# Patient Record
Sex: FEMALE | HISPANIC OR LATINO | URBAN - METROPOLITAN AREA
[De-identification: names, ages, dates, MRNs, and addresses within clinical notes are randomized per-mention and may not be internally consistent; named-entity substitution may affect disease eponyms.]

---

## 2019-12-13 ENCOUNTER — EMERGENCY (EMERGENCY)
Facility: HOSPITAL | Age: 54
LOS: 1 days | Discharge: HOME | End: 2019-12-13
Attending: EMERGENCY MEDICINE | Admitting: EMERGENCY MEDICINE
Payer: COMMERCIAL

## 2019-12-13 VITALS
TEMPERATURE: 98 F | SYSTOLIC BLOOD PRESSURE: 155 MMHG | HEIGHT: 62 IN | WEIGHT: 147.93 LBS | HEART RATE: 86 BPM | RESPIRATION RATE: 18 BRPM | OXYGEN SATURATION: 99 % | DIASTOLIC BLOOD PRESSURE: 92 MMHG

## 2019-12-13 VITALS
TEMPERATURE: 99 F | DIASTOLIC BLOOD PRESSURE: 67 MMHG | SYSTOLIC BLOOD PRESSURE: 108 MMHG | RESPIRATION RATE: 18 BRPM | HEART RATE: 86 BPM | OXYGEN SATURATION: 97 %

## 2019-12-13 LAB
ALBUMIN SERPL ELPH-MCNC: 4.8 G/DL — SIGNIFICANT CHANGE UP (ref 3.3–5)
ALP SERPL-CCNC: 65 U/L — SIGNIFICANT CHANGE UP (ref 40–120)
ALT FLD-CCNC: 26 U/L — SIGNIFICANT CHANGE UP (ref 10–45)
ANION GAP SERPL CALC-SCNC: 13 MMOL/L — SIGNIFICANT CHANGE UP (ref 5–17)
APPEARANCE UR: CLEAR — SIGNIFICANT CHANGE UP
AST SERPL-CCNC: 26 U/L — SIGNIFICANT CHANGE UP (ref 10–40)
BASOPHILS # BLD AUTO: 0.03 K/UL — SIGNIFICANT CHANGE UP (ref 0–0.2)
BASOPHILS NFR BLD AUTO: 0.2 % — SIGNIFICANT CHANGE UP (ref 0–2)
BILIRUB SERPL-MCNC: 0.5 MG/DL — SIGNIFICANT CHANGE UP (ref 0.2–1.2)
BILIRUB UR-MCNC: NEGATIVE — SIGNIFICANT CHANGE UP
BUN SERPL-MCNC: 8 MG/DL — SIGNIFICANT CHANGE UP (ref 7–23)
CALCIUM SERPL-MCNC: 10 MG/DL — SIGNIFICANT CHANGE UP (ref 8.4–10.5)
CHLORIDE SERPL-SCNC: 101 MMOL/L — SIGNIFICANT CHANGE UP (ref 96–108)
CO2 SERPL-SCNC: 25 MMOL/L — SIGNIFICANT CHANGE UP (ref 22–31)
COLOR SPEC: YELLOW — SIGNIFICANT CHANGE UP
CREAT SERPL-MCNC: 0.7 MG/DL — SIGNIFICANT CHANGE UP (ref 0.5–1.3)
DIFF PNL FLD: NEGATIVE — SIGNIFICANT CHANGE UP
EOSINOPHIL # BLD AUTO: 0.04 K/UL — SIGNIFICANT CHANGE UP (ref 0–0.5)
EOSINOPHIL NFR BLD AUTO: 0.3 % — SIGNIFICANT CHANGE UP (ref 0–6)
GLUCOSE SERPL-MCNC: 114 MG/DL — HIGH (ref 70–99)
GLUCOSE UR QL: NEGATIVE — SIGNIFICANT CHANGE UP
HCT VFR BLD CALC: 39.2 % — SIGNIFICANT CHANGE UP (ref 34.5–45)
HGB BLD-MCNC: 12.8 G/DL — SIGNIFICANT CHANGE UP (ref 11.5–15.5)
IMM GRANULOCYTES NFR BLD AUTO: 0.3 % — SIGNIFICANT CHANGE UP (ref 0–1.5)
KETONES UR-MCNC: ABNORMAL MG/DL
LEUKOCYTE ESTERASE UR-ACNC: NEGATIVE — SIGNIFICANT CHANGE UP
LYMPHOCYTES # BLD AUTO: 15.7 % — SIGNIFICANT CHANGE UP (ref 13–44)
LYMPHOCYTES # BLD AUTO: 2.12 K/UL — SIGNIFICANT CHANGE UP (ref 1–3.3)
MCHC RBC-ENTMCNC: 28.2 PG — SIGNIFICANT CHANGE UP (ref 27–34)
MCHC RBC-ENTMCNC: 32.7 GM/DL — SIGNIFICANT CHANGE UP (ref 32–36)
MCV RBC AUTO: 86.3 FL — SIGNIFICANT CHANGE UP (ref 80–100)
MONOCYTES # BLD AUTO: 1.38 K/UL — HIGH (ref 0–0.9)
MONOCYTES NFR BLD AUTO: 10.2 % — SIGNIFICANT CHANGE UP (ref 2–14)
NEUTROPHILS # BLD AUTO: 9.88 K/UL — HIGH (ref 1.8–7.4)
NEUTROPHILS NFR BLD AUTO: 73.3 % — SIGNIFICANT CHANGE UP (ref 43–77)
NITRITE UR-MCNC: NEGATIVE — SIGNIFICANT CHANGE UP
NRBC # BLD: 0 /100 WBCS — SIGNIFICANT CHANGE UP (ref 0–0)
PH UR: 6 — SIGNIFICANT CHANGE UP (ref 5–8)
PLATELET # BLD AUTO: 282 K/UL — SIGNIFICANT CHANGE UP (ref 150–400)
POTASSIUM SERPL-MCNC: 3.9 MMOL/L — SIGNIFICANT CHANGE UP (ref 3.5–5.3)
POTASSIUM SERPL-SCNC: 3.9 MMOL/L — SIGNIFICANT CHANGE UP (ref 3.5–5.3)
PROT SERPL-MCNC: 8.3 G/DL — SIGNIFICANT CHANGE UP (ref 6–8.3)
PROT UR-MCNC: NEGATIVE MG/DL — SIGNIFICANT CHANGE UP
RBC # BLD: 4.54 M/UL — SIGNIFICANT CHANGE UP (ref 3.8–5.2)
RBC # FLD: 12.8 % — SIGNIFICANT CHANGE UP (ref 10.3–14.5)
SODIUM SERPL-SCNC: 139 MMOL/L — SIGNIFICANT CHANGE UP (ref 135–145)
SP GR SPEC: 1.02 — SIGNIFICANT CHANGE UP (ref 1–1.03)
UROBILINOGEN FLD QL: 0.2 E.U./DL — SIGNIFICANT CHANGE UP
WBC # BLD: 13.49 K/UL — HIGH (ref 3.8–10.5)
WBC # FLD AUTO: 13.49 K/UL — HIGH (ref 3.8–10.5)

## 2019-12-13 PROCEDURE — 76856 US EXAM PELVIC COMPLETE: CPT | Mod: 26

## 2019-12-13 PROCEDURE — 80053 COMPREHEN METABOLIC PANEL: CPT

## 2019-12-13 PROCEDURE — 36415 COLL VENOUS BLD VENIPUNCTURE: CPT

## 2019-12-13 PROCEDURE — 85025 COMPLETE CBC W/AUTO DIFF WBC: CPT

## 2019-12-13 PROCEDURE — 76830 TRANSVAGINAL US NON-OB: CPT

## 2019-12-13 PROCEDURE — 76830 TRANSVAGINAL US NON-OB: CPT | Mod: 26

## 2019-12-13 PROCEDURE — 96374 THER/PROPH/DIAG INJ IV PUSH: CPT

## 2019-12-13 PROCEDURE — 99284 EMERGENCY DEPT VISIT MOD MDM: CPT | Mod: 25

## 2019-12-13 PROCEDURE — 99285 EMERGENCY DEPT VISIT HI MDM: CPT

## 2019-12-13 PROCEDURE — ZZZZZ: CPT

## 2019-12-13 PROCEDURE — 76856 US EXAM PELVIC COMPLETE: CPT

## 2019-12-13 PROCEDURE — 81003 URINALYSIS AUTO W/O SCOPE: CPT

## 2019-12-13 PROCEDURE — 87086 URINE CULTURE/COLONY COUNT: CPT

## 2019-12-13 RX ORDER — IBUPROFEN 200 MG
1 TABLET ORAL
Qty: 30 | Refills: 0
Start: 2019-12-13

## 2019-12-13 RX ORDER — MORPHINE SULFATE 50 MG/1
4 CAPSULE, EXTENDED RELEASE ORAL ONCE
Refills: 0 | Status: DISCONTINUED | OUTPATIENT
Start: 2019-12-13 | End: 2019-12-13

## 2019-12-13 RX ADMIN — MORPHINE SULFATE 4 MILLIGRAM(S): 50 CAPSULE, EXTENDED RELEASE ORAL at 11:49

## 2019-12-13 NOTE — CONSULT NOTE ADULT - ATTENDING COMMENTS
Patient discussed with Dr. Henriquez. Plan to follow up with me in the office for additional work up.

## 2019-12-13 NOTE — ED PROVIDER NOTE - NSFOLLOWUPINSTRUCTIONS_ED_ALL_ED_FT
Take motrin for pain as prescribed.  Call to make appointment with GYN oncology specialist within one week.  Return to ED for fever, vomiting, worsening pain, dizziness, fainting.    Tumores ováricos  Ovarian Tumors     Los tumores ováricos son crecimientos sólidos en los órganos que producen óvulos en las mujeres (ovarios). Los ovarios están ubicados a cada lado del útero. Los tumores son diferentes de los quistes ováricos, que están llenos de líquido.  Los tumores pueden ser cancerosos o no cancerosos. Todos los tumores sólidos deben ser evaluados por un médico para garantizar de que no garcia cancerosos.  ¿Cuáles son las causas?  Se desconoce la causa de esta afección.  ¿Qué incrementa el riesgo?  Esta afección es más probable en las mujeres que tienen estas características:  Hair atravesado la menopausia.Son mayores de 50 años.Son descendientes de [facility] o europeos del [facility].Tienen antecedentes personales o familiares de cáncer de endometrio, ovario, colon o mama.Son obesas.Son portadoras de alguno de los genes asociados con el cáncer de mama (BRCA1 o BRCA2).Joseph medicamentos para la fertilidad.Joseph estrógeno después de la menopausia.Nicolas primer embarazo fue cuando konrad mayores de 30 años.Nunca hair llevado a término un embarazo.Nunca hair tenido un embarazo.¿Cuáles son los signos o los síntomas?  Los tumores no cancerosos pueden no causar ningún síntoma. Los tumores cancerosos pueden causar síntomas leves, similares a los de otros problemas de taiwo. Los síntomas de los tumores ováricos cancerosos incluyen los siguientes:  Pérdida de peso sin causa aparente.Un aumento en el tamaño del abdomen.Dolor en el abdomen.Dolor o presión en la espalda o en la maral entre los huesos de la cadera (pelvis).Cansancio.Sangrado vaginal anómalo.Pérdida del apetito.Micción frecuente o presión en la vejiga.Empacho, aumento de gases y meteorismo.Dolor brittany las relaciones sexuales.¿Cómo se diagnostica?  Esta afección se puede diagnosticar en función de los resultados de lo siguiente:  Un examen pélvico.Análisis de pilo.Estudios de diagnóstico por imágenes, amada jenise ecografía, jenise radiografía, jenise exploración por tomografía computarizada (TC) o jenise resonancia magnética (RM).Jenise biopsia. Fauzia es un procedimiento en el que se extirpa jenise pequeña muestra de tejido del tumor para examinarla con un microscopio.Jenise laparoscopia. Fauzia es un procedimiento en el que se inserta un tubo reed e iluminado por jenise pequeña incisión en la parte inferior del abdomen, que se utiliza para balbir imágenes de los órganos de la pelvis.¿Cómo se trata?  Si tiene un tumor no canceroso, el tratamiento más común es la cirugía para extirpar el tumor.  Si tiene un tumor canceroso, el tratamiento puede incluir justino o más de los siguientes procedimientos o tratamientos:  Cirugía para extirpar el tumor y el ovario. En algunos casos, la cirugía puede implicar la extirpación de ambos ovarios, las trompas de Falopio, el útero, el daniel uterino y los ganglios linfáticos circundantes para verificar si el cáncer se ha diseminado.Radioterapia. Fauzia tratamiento utiliza maylin de abisai de alen potencia para destruir las células cancerosas.Quimioterapia. Fauzia tratamiento utiliza medicamentos para destruir las células cancerosas.Terapia hormonal.Terapia dirigida. [place] significa que el tratamiento está dirigido a células cancerosas específicas para evitar afectar a las células normales.Siga estas indicaciones en nicolas casa:  [place] los medicamentos de venta ravi y los recetados solamente amada se lo haya indicado el médico.No consuma ningún producto que contenga nicotina o tabaco, amada cigarrillos y cigarrillos electrónicos. Si necesita ayuda para dejar de fumar, consulte al médico.Reanude rebecca actividades normales amada se lo haya indicado el médico. Pregúntele al médico qué actividades son seguras para usted.Hágase un examen físico y ginecológico todos los años. Si es mayor de 40 años, esto incluye un examen pélvico.Concurra a todas las visitas de control amada se lo haya indicado el médico. [place] es importante.Comuníquese con un médico si:  Pierde peso sin motivo.Siente un malestar generalizado.Tiene alteraciones en la función intestinal o de la vejiga.Solicite ayuda de inmediato si:  Tiene síntomas nuevos o repentinos que no desaparecen.Resumen  Los tumores ováricos son crecimientos sólidos en los órganos que producen óvulos en las mujeres (ovarios). Los ovarios están ubicados a cada lado del útero.Los tumores no cancerosos pueden no causar ningún síntoma. Los tumores cancerosos pueden causar síntomas leves, similares a los de otros problemas de taiwo.Si tiene un tumor no canceroso, el tratamiento más común es la cirugía para extirpar el tumor.Un tumor canceroso puede tratarse con cirugía. También pueden recomendarse quimioterapia, terapia dirigida, radioterapia o jenise combinación de estos tratamientos.Obtenga ayuda de inmediato si tiene síntomas nuevos o repentinos que no desaparecen.Esta información no tiene amada fin reemplazar el consejo del médico. Asegúrese de hacerle al médico cualquier pregunta que tenga.

## 2019-12-13 NOTE — ED ADULT NURSE NOTE - CHIEF COMPLAINT QUOTE
Patient complaining of left sided pelvic pain, seen 12/12/19 at [facility] and diagnosed with ovarian cyst.  Patient denies any N/V, fevers, vaginal bleeding or any other complaints at this time.

## 2019-12-13 NOTE — ED PROVIDER NOTE - PATIENT PORTAL LINK FT
You can access the FollowMyHealth Patient Portal offered by [facility] by registering at the following website: [email or web address]. By joining [email or web address]’s FollowMyHealth portal, you will also be able to view your health information using other applications (apps) compatible with our system.

## 2019-12-13 NOTE — ED PROVIDER NOTE - PHYSICAL EXAMINATION
Vitals reviewed  Gen: moderate painful distress, speaking in full sentences  Skin: wwp   HEENT: ncat, eomi, mmm  CV: rrr, no audible m/r/g  Resp: ctab, no w/r/r  Abd: nondistended, soft/nt, no cvat  Pelvic: no bleeding/discharge, no cmt, +L adnexal ttp, no mass  Ext: FROM throughout, no peripheral edema  Neuro: alert/oriented, no focal deficits, steady gait

## 2019-12-13 NOTE — ED PROVIDER NOTE - CLINICAL SUMMARY MEDICAL DECISION MAKING FREE TEXT BOX
54 F Polish speaking s/p hysterectomy still w/ b/l ovaries p/w pelvic pain since yesterday, L adnexal ttp on exam- no d/c or CMT.  Pain alleviated with one dose morphine.  labs reviewed; wbc 13, stable h/h, UA no infection.  Pelvic sono shows asymmetrically large edematous left ovary with decreased velocity in the arterial/venous blood flow, left hydrosalpinx and complex free fluid. Left ovarian torsion cannot be ruled out. Probable small hemoperitoneum. GYN consulted, do not suspect torsion- decreased flow likely 2/2 h/o hysterectomy, concern for malignancy but recommend outpt follow up with GYN oncologist.  pt given referral and instructed to call for appointment.  rx motrin sent to pharmacy.  discussed strict return parameters.  d/w attending

## 2019-12-13 NOTE — CONSULT NOTE ADULT - ASSESSMENT
55yo s/p hysterectomy presenting with 1 week of LLQ pain found to have 2.5cm hypoechoic structure in left adnexa which could represent a hydrosalpinx. After discussion with GYN Oncology, finding most likely represents remnant fallopian tube in which in setting of hysterectomy could lead to a 10% risk of hydrosalpinx. No signs of acute abdomen at this time and need for surgical intervention. Patient can be discharged at this time and information for follow up with Dr. Serna given to the patient. Anticipatory guidance discussed with the patient.     Discussed with Dr. Serna.

## 2019-12-13 NOTE — ED ADULT TRIAGE NOTE - CHIEF COMPLAINT QUOTE
Patient complaining of left sided pelvic pain, seen 12/12/19 at [facility] and diagnosed with ovarian cyst.  Patient denies any N/V, fevers, vaginal bleeding or any other complaints at this time.

## 2019-12-13 NOTE — ED PROVIDER NOTE - CARE PROVIDER_API CALL
Gloria Serna)  Obstetrics and Gynecology  [address] [place], [place] [address]  Phone: [phone]  Fax: [phone]  Follow Up Time:

## 2019-12-13 NOTE — ED PROVIDER NOTE - OBJECTIVE STATEMENT
54 F Arabic speaking s/p hysterectomy still w/ b/l ovaries p/w pelvic pain since yesterday.  Pt reports being seen at [facility] yesterday, diagnosed with L ovarian cyst (also given doxycycline for PID as per paperwork) then L pelvic pain worsened overnight.  Reports sharp stabbing intermittent L pelvic pain much worse than yesterday.  Also c/o dysuria and constipation; last BM 2 days ago but +flatus.  Denies fever, chills, dizziness, fainting, chest pain, sob, nvd, melena, hematuria, vaginal bleeding/discharge, trauma

## 2019-12-13 NOTE — CONSULT NOTE ADULT - SUBJECTIVE AND OBJECTIVE BOX
53yo Lao speaking P1 presenting with one week of worsening LLQ pain. Patient was seen on 12/6 at [place] where she was diagnosed with ovarian cyst and was discharged home on course of doxycycline. She describes the pain as sharp, comes and goes. Denies nausea, vomiting. Has constipation at baseline and has been on bowel regimen. Denies bloating, unexpected weightloss. She is s/p what patient describes as LAVH or TLH (they took out uterus through vagina but also made small incisions) with bilateral salpingectomy. Denies any vaginal bleeding or vaginal discharge.     OBHx:   C/S x1    GYNHx: denies abnormal pap smears or STDs; s/p hysterectomy in 2016 for fibroid uterus  PMH: denies  PSH: C/S x1; hysterectomy 2016; abdominoplasty/liposuction 10/2019 in [facility]   Meds: doxycycline  All: NKDA      Physical Exam  Vital Signs Last 24 Hrs  T(C): 37 (13 Dec 2019 17:39), Max: 37 (13 Dec 2019 17:39)  T(F): 98.6 (13 Dec 2019 17:39), Max: 98.6 (13 Dec 2019 17:39)  HR: 86 (13 Dec 2019 17:39) (86 - 86)  BP: 108/67 (13 Dec 2019 17:39) (108/67 - 155/92)  BP(mean): --  RR: 18 (13 Dec 2019 17:39) (18 - 18)  SpO2: 97% (13 Dec 2019 17:39) (97% - 99%)    Gen: on her cell phone upon walking in room; in no acute distress  Abd: soft, nondistended, mild tenderness to palpation in LLQ; no rebound or guarding   Pelvic: normal vagina; no cervix; some adnexal tenderness of left with small smooth, mobile mass palpated                          12.8   13.49 )-----------( 282      ( 13 Dec 2019 12:08 )             39.2       12-13    139  |  101  |  8   ----------------------------<  114<H>  3.9   |  25  |  0.70    Ca    10.0      13 Dec 2019 12:08    TPro  8.3  /  Alb  4.8  /  TBili  0.5  /  DBili  x   /  AST  26  /  ALT  26  /  AlkPhos  65  12-13        EXAM:  US PELVIC COMPLETE                          EXAM:  US TRANSVAGINAL                          PROCEDURE DATE:  12/13/2019          INTERPRETATION:  PELVIC ULTRASOUND dated 12/13/2019 2:40 PM  INDICATION: Left pelvic pain. History of hysterectomy 2016. LMP: N/A    TECHNIQUE: Transabdominal views of the pelvis were obtained followed by   transvaginal views for better visualization of the ovaries.      PRIOR STUDIES: None    FINDINGS:   No uterus visualized, history of hysterectomy. The cervical cuff appears   normal.    The right ovary is normal in size, measuring 3.0 x 2.0 x 2.3 cm(volume 7   mL). No right ovarian masses are seen. The left ovary appears hypoechoic   and increased in size, measuring 4.3 x 2.8 x 2.7 cm(volume 17 mL) . No   left ovarian masses are seen. Both arterial and venous blood flow are   identified in the right ovary on Doppler imaging. There is comparatively   decreased arteriovenous blood flow in the left ovary on Doppler imaging.   There is an approximately 2.5 cm hypoechoic with minimal internal debris   rounded structure in the left adnexa which may represent a fluid-filled   fallopian tube.     There is small complex free fluid in the cul-de-sac.    Impression: Asymmetrically large edematous left ovarywith decreased   velocity in the arterial/venous blood flow, left hydrosalpinx and complex   free fluid. Left ovarian torsion cannot be ruled out. Probable small   hemoperitoneum. Gynecological/surgical consult is recommended.  Normal right ovary.    The above results were discussed with DANNY Horta on 12/13/2019 2:54   PM.

## 2019-12-13 NOTE — ED ADULT NURSE NOTE - OBJECTIVE STATEMENT
Pt is a 55 y/o female c/o left lumbar pain radiating to LLQ abdominal. Pt reports being seen in the ED yesterday and dx with ovarian cyst. Pt reports pain worsened today. Pt reports dysuria, denies hematuria. Pt reports constipation, LBM 3 days ago. PT denies N/V, fever, chills, dizziness.

## 2019-12-13 NOTE — ED PROVIDER NOTE - ATTENDING CONTRIBUTION TO CARE
54 F yo Sierra Leonean speaking s/p hysterectomy (still w/ b/l ovaries) p/w pelvic pain since yesterday.  Pt reports being seen at [facility] yesterday, diagnosed with L ovarian cyst (also given doxycycline for PID as per paperwork) then L pelvic pain worsened overnight. Reports sharp stabbing intermittent L pelvic pain much worse than yesterday. Also c/o dysuria and constipation; last BM 2 days ago but +flatus.  Denies fever, chills, dizziness, fainting, chest pain, sob, nvd, melena, hematuria, vaginal bleeding/discharge, trauma. Pt AAO, NAD, Pelvic exam per PA exam. Abd: soft with left lower abd TTP. Pain alleviated with one dose of morphine. Labs reviewed; wbc 13, stable h/h, UA no infection. Pelvic sono shows asymmetrically large edematous left ovary with decreased velocity in the arterial/venous blood flow, left hydrosalpinx and complex free fluid. Left ovarian torsion cannot be ruled out. Probable small hemoperitoneum. GYN consulted, do not suspect torsion- decreased flow likely 2/2 h/o hysterectomy, concern for malignancy but recommend outpt follow up with GYN oncologist.  pt given referral and instructed to call for appointment.  Rx motrin sent to pharmacy. Strict return precautions given.

## 2019-12-14 LAB
CULTURE RESULTS: SIGNIFICANT CHANGE UP
SPECIMEN SOURCE: SIGNIFICANT CHANGE UP

## 2019-12-21 DIAGNOSIS — R10.2 PELVIC AND PERINEAL PAIN: ICD-10-CM

## 2019-12-21 DIAGNOSIS — R30.0 DYSURIA: ICD-10-CM

## 2019-12-21 DIAGNOSIS — K59.00 CONSTIPATION, UNSPECIFIED: ICD-10-CM

## 2024-07-16 NOTE — ED PROVIDER NOTE - CROS ED ROS STATEMENT
all other ROS negative except as per HPI My signature below certifies that the above stated patient is homebound and upon completion of the Face-To-Face encounter, has the need for intermittent skilled nursing, physical therapy and/or speech or occupational therapy services in their home for their current diagnosis as outlined in their initial plan of care. These services will continue to be monitored by myself or another physician.